# Patient Record
Sex: FEMALE | Race: WHITE
[De-identification: names, ages, dates, MRNs, and addresses within clinical notes are randomized per-mention and may not be internally consistent; named-entity substitution may affect disease eponyms.]

---

## 2019-04-13 ENCOUNTER — HOSPITAL ENCOUNTER (EMERGENCY)
Dept: HOSPITAL 25 - UCCORT | Age: 19
Discharge: HOME | End: 2019-04-13
Payer: COMMERCIAL

## 2019-04-13 VITALS — DIASTOLIC BLOOD PRESSURE: 57 MMHG | SYSTOLIC BLOOD PRESSURE: 123 MMHG
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
## 2019-04-13 DIAGNOSIS — S90.02XA: Primary | ICD-10-CM

## 2019-04-13 PROCEDURE — G0463 HOSPITAL OUTPT CLINIC VISIT: HCPCS

## 2019-04-13 PROCEDURE — 99213 OFFICE O/P EST LOW 20 MIN: CPT

## 2019-04-14 NOTE — [facility]
- Progress Note


Progress Note: 





wet read correct





Course/Dx





- Diagnoses


Provider Diagnoses: 


 Contusion of left ankle








Discharge





- Sign-Out/Discharge


Documenting (check all that apply): Post-Discharge Follow Up


All imaging exams completed and their final reports reviewed: Yes





- Discharge Plan


Condition: Stable


Disposition: HOME


Patient Education Materials:  Contusion in Adults (ED)


Referrals: 


Sabrina Eldridge NP [Primary Care Provider] - 


Additional Instructions: 


If you develop a fever, shortness of breath, chest pain, new or worsening 

symptoms - please call your PCP or go to the ED.


 


1) Rest, Ice, and elevate your ankle/foot as much as possible


2) Use the ACE wrap and gel splint for support and comfort


3) Use the crutches to be non-weight bearing until you are feeling better


4) If your symptoms do not improve in 5-7 days, please be rechecked





- Billing Disposition and Condition


Condition: STABLE


Disposition: Home